# Patient Record
Sex: MALE | Race: WHITE | NOT HISPANIC OR LATINO | URBAN - METROPOLITAN AREA
[De-identification: names, ages, dates, MRNs, and addresses within clinical notes are randomized per-mention and may not be internally consistent; named-entity substitution may affect disease eponyms.]

---

## 2023-10-12 ENCOUNTER — APPOINTMENT (OUTPATIENT)
Dept: UROLOGY | Facility: CLINIC | Age: 63
End: 2023-10-12
Payer: MEDICAID

## 2023-10-12 ASSESSMENT — ENCOUNTER SYMPTOMS
EYES NEGATIVE: 1
ENDOCRINE NEGATIVE: 1
COUGH: 0
PSYCHIATRIC NEGATIVE: 1
NAUSEA: 0
CHILLS: 0
DIFFICULTY URINATING: 0
SHORTNESS OF BREATH: 0
FEVER: 0
ALLERGIC/IMMUNOLOGIC NEGATIVE: 1

## 2023-10-12 NOTE — PROGRESS NOTES
Subjective   Patient ID: Nazario Alejandra is a 63 y.o. male.    HPI  Patient is here for kidney stone. Recent CT on 10/4/23 showed 3mm left nonobstructive renal stone. No hx of stones.  Chronic BPH sx are mild and stable. Denies urgency and frequency. Denies dysuria. Denies hematuria. Nocturia x1. He is taking Flomax and finds this helpful. Hx of retention. Does know [facility] but has not had to do this. Recent CR was 0.84 on 7/22. Most recent PSA was 0.87 on 7/22. Prior PSA was 0.67 on 2/2019. ED is chronic. No medication for this.      Review of Systems   Constitutional:  Negative for chills and fever.   HENT: Negative.     Eyes: Negative.    Respiratory:  Negative for cough and shortness of breath.    Cardiovascular:  Negative for chest pain and leg swelling.   Gastrointestinal:  Negative for nausea.   Endocrine: Negative.    Genitourinary:  Negative for difficulty urinating.        Negative except for documented in HPI   Allergic/Immunologic: Negative.    Neurological:         Alert & oriented X 3   Hematological:         Denies blood thinners   Psychiatric/Behavioral: Negative.         Objective   Physical Exam    Assessment/Plan   There are no diagnoses linked to this encounter.

## 2024-03-05 PROBLEM — N13.8 BENIGN PROSTATIC HYPERPLASIA WITH URINARY OBSTRUCTION: Status: ACTIVE | Noted: 2022-11-03

## 2024-03-05 PROBLEM — N52.9 ERECTILE DYSFUNCTION: Status: ACTIVE | Noted: 2022-11-03

## 2024-03-05 PROBLEM — R35.1 NOCTURIA: Status: ACTIVE | Noted: 2024-03-05

## 2024-03-05 PROBLEM — N40.1 BENIGN PROSTATIC HYPERPLASIA WITH URINARY OBSTRUCTION: Status: ACTIVE | Noted: 2022-11-03

## 2024-03-05 ASSESSMENT — ENCOUNTER SYMPTOMS
CHILLS: 0
NAUSEA: 0
EYES NEGATIVE: 1
ENDOCRINE NEGATIVE: 1
PSYCHIATRIC NEGATIVE: 1
FEVER: 0
SHORTNESS OF BREATH: 0
DIFFICULTY URINATING: 0
COUGH: 0
ALLERGIC/IMMUNOLOGIC NEGATIVE: 1

## 2024-03-05 NOTE — PROGRESS NOTES
Subjective   Patient ID: Nazario Alejandra is a 63 y.o. male.    HPI  Patient is here for prostate issues. He states he is most bothered at night by hesitancy and small volumes. Nocturia is 3x.  He is taking Flomax and does not find this helpful any more. He does know [facility] but has not had to do this. CT done on 10/23 showed left sided 3mm stone.       Review of Systems   Constitutional:  Negative for chills and fever.   HENT: Negative.     Eyes: Negative.    Respiratory:  Negative for cough and shortness of breath.    Cardiovascular:  Negative for chest pain and leg swelling.   Gastrointestinal:  Negative for nausea.   Endocrine: Negative.    Genitourinary:  Negative for difficulty urinating.        Negative except for documented in HPI   Allergic/Immunologic: Negative.    Neurological:         Alert & oriented X 3   Hematological:         Denies blood thinners   Psychiatric/Behavioral: Negative.         Objective   Physical Exam  Vitals and nursing note reviewed.   Constitutional:       General: He is not in acute distress.     Appearance: Normal appearance.   Pulmonary:      Effort: Pulmonary effort is normal.   Abdominal:      Tenderness: There is no abdominal tenderness.   Genitourinary:     Comments: Kidneys non palpable bilaterally  Bladder non palpable or tender  Scrotum no mass, No hydrocele  Epididymis- No spermatocele. Non Tender.  Testicles: No mass. WNL  Urethra: No discharge  Penis within normal limits... No lesions. CIrcumcised  Prostate - symmetric, no nodules. Benign  Seminal Vesicals: No mass.  Sphincter tone: normal  Neurological:      Mental Status: He is alert.         Assessment/Plan   Diagnoses and all orders for this visit:  Benign prostatic hyperplasia with urinary obstruction  Erectile dysfunction, unspecified erectile dysfunction type  Nocturia    All available PSA values reviewed, Options discussed. Questions answered.  PSA ordered   Diet changes for prostate health discussed and educational  information given. Pros/Cons of prostate health supplements discussed.   Treatment options for LUTS reviewed  Increase Flomax to 0.8mg po at bedtime for BPH/Nocturia  Discussed DDAVP if Sx persist  Discussed timed voiding. Discussed fluid and caffeine intake  Treatment options for ED reviewed.  Lifestyle change to help prevent UTIs discussed. Encouraged fluid intake.  Past UA reviewed-Chronic microhematuria-Discussed Cysto  CT reeviewed from 10/23    F/U

## 2024-03-07 ENCOUNTER — OFFICE VISIT (OUTPATIENT)
Dept: UROLOGY | Facility: CLINIC | Age: 64
End: 2024-03-07
Payer: COMMERCIAL

## 2024-03-07 ENCOUNTER — LAB (OUTPATIENT)
Dept: LAB | Facility: LAB | Age: 64
End: 2024-03-07
Payer: MEDICARE

## 2024-03-07 VITALS
WEIGHT: 225 LBS | BODY MASS INDEX: 29.82 KG/M2 | DIASTOLIC BLOOD PRESSURE: 73 MMHG | HEART RATE: 77 BPM | SYSTOLIC BLOOD PRESSURE: 176 MMHG | HEIGHT: 73 IN

## 2024-03-07 DIAGNOSIS — N13.8 BENIGN PROSTATIC HYPERPLASIA WITH URINARY OBSTRUCTION: ICD-10-CM

## 2024-03-07 DIAGNOSIS — N40.1 BENIGN PROSTATIC HYPERPLASIA WITH URINARY OBSTRUCTION: ICD-10-CM

## 2024-03-07 DIAGNOSIS — N52.9 ERECTILE DYSFUNCTION, UNSPECIFIED ERECTILE DYSFUNCTION TYPE: ICD-10-CM

## 2024-03-07 DIAGNOSIS — R35.1 NOCTURIA: ICD-10-CM

## 2024-03-07 LAB — PSA SERPL-MCNC: 0.58 NG/ML

## 2024-03-07 PROCEDURE — 36415 COLL VENOUS BLD VENIPUNCTURE: CPT

## 2024-03-07 PROCEDURE — 84153 ASSAY OF PSA TOTAL: CPT

## 2024-03-07 PROCEDURE — 1159F MED LIST DOCD IN RCRD: CPT | Performed by: UROLOGY

## 2024-03-07 PROCEDURE — 99214 OFFICE O/P EST MOD 30 MIN: CPT | Performed by: UROLOGY

## 2024-03-07 RX ORDER — TAMSULOSIN HYDROCHLORIDE 0.4 MG/1
0.4 CAPSULE ORAL
COMMUNITY
Start: 2022-10-09 | End: 2024-03-11 | Stop reason: SDUPTHER

## 2024-03-07 RX ORDER — IPRATROPIUM BROMIDE AND ALBUTEROL SULFATE 2.5; .5 MG/3ML; MG/3ML
3 SOLUTION RESPIRATORY (INHALATION) AS NEEDED
COMMUNITY
Start: 2023-01-18

## 2024-03-07 RX ORDER — EXENATIDE 2 MG/.85ML
INJECTION, SUSPENSION, EXTENDED RELEASE SUBCUTANEOUS
COMMUNITY

## 2024-03-07 RX ORDER — INSULIN LISPRO 100 [IU]/ML
12 INJECTION, SOLUTION INTRAVENOUS; SUBCUTANEOUS 3 TIMES DAILY
COMMUNITY
Start: 2022-03-11

## 2024-03-07 RX ORDER — ASPIRIN 81 MG/1
81 TABLET ORAL
COMMUNITY

## 2024-03-07 RX ORDER — SYRING-NEEDL,DISP,INSUL,0.3 ML 30 GX5/16"
SYRINGE, EMPTY DISPOSABLE MISCELLANEOUS
COMMUNITY
Start: 2023-07-06

## 2024-03-07 RX ORDER — INSULIN GLARGINE 100 [IU]/ML
52 INJECTION, SOLUTION SUBCUTANEOUS
COMMUNITY
Start: 2022-03-11

## 2024-03-07 RX ORDER — LANCETS 28 GAUGE
EACH MISCELLANEOUS
COMMUNITY
Start: 2024-02-09

## 2024-03-07 RX ORDER — DAPAGLIFLOZIN 10 MG/1
10 TABLET, FILM COATED ORAL DAILY
COMMUNITY

## 2024-03-07 RX ORDER — GABAPENTIN 600 MG/1
600 TABLET ORAL 3 TIMES DAILY
COMMUNITY

## 2024-03-07 RX ORDER — ATORVASTATIN CALCIUM 40 MG/1
40 TABLET, FILM COATED ORAL
COMMUNITY
Start: 2017-03-28

## 2024-03-07 RX ORDER — FAMOTIDINE 40 MG/1
TABLET, FILM COATED ORAL
COMMUNITY

## 2024-03-07 RX ORDER — GLIPIZIDE 10 MG/1
10 TABLET ORAL
COMMUNITY

## 2024-03-07 RX ORDER — VERAPAMIL HYDROCHLORIDE 180 MG/1
180 TABLET, FILM COATED, EXTENDED RELEASE ORAL
COMMUNITY
Start: 2022-07-08

## 2024-03-07 RX ORDER — ALBUTEROL SULFATE 90 UG/1
2 AEROSOL, METERED RESPIRATORY (INHALATION) EVERY 4 HOURS PRN
COMMUNITY
Start: 2022-01-24

## 2024-03-07 RX ORDER — LISINOPRIL 10 MG/1
10 TABLET ORAL
COMMUNITY
Start: 2024-01-19 | End: 2024-10-29

## 2024-03-07 RX ORDER — OXYCODONE AND ACETAMINOPHEN 5; 325 MG/1; MG/1
1 TABLET ORAL 4 TIMES DAILY
COMMUNITY

## 2024-03-07 RX ORDER — ESCITALOPRAM OXALATE 20 MG/1
1 TABLET ORAL DAILY
COMMUNITY
Start: 2020-10-05

## 2024-03-07 RX ORDER — CLORAZEPATE DIPOTASSIUM 7.5 MG/1
7.5 TABLET ORAL 3 TIMES DAILY
COMMUNITY

## 2024-03-07 RX ORDER — DESVENLAFAXINE 50 MG/1
50 TABLET, EXTENDED RELEASE ORAL EVERY 24 HOURS
COMMUNITY

## 2024-03-07 RX ORDER — DESVENLAFAXINE SUCCINATE 50 MG/1
100 TABLET, EXTENDED RELEASE ORAL
COMMUNITY
Start: 2024-01-19

## 2024-03-07 RX ORDER — GLIMEPIRIDE 4 MG/1
4 TABLET ORAL
COMMUNITY

## 2024-03-07 RX ORDER — HYDROXYZINE HYDROCHLORIDE 25 MG/1
10 TABLET, FILM COATED ORAL NIGHTLY
COMMUNITY

## 2024-03-07 ASSESSMENT — PATIENT HEALTH QUESTIONNAIRE - PHQ9
SUM OF ALL RESPONSES TO PHQ9 QUESTIONS 1 AND 2: 0
1. LITTLE INTEREST OR PLEASURE IN DOING THINGS: NOT AT ALL
2. FEELING DOWN, DEPRESSED OR HOPELESS: NOT AT ALL

## 2024-03-08 ASSESSMENT — ENCOUNTER SYMPTOMS
DIFFICULTY URINATING: 0
NAUSEA: 0
CHILLS: 0
FEVER: 0
PSYCHIATRIC NEGATIVE: 1
EYES NEGATIVE: 1
COUGH: 0
ALLERGIC/IMMUNOLOGIC NEGATIVE: 1
SHORTNESS OF BREATH: 0
ENDOCRINE NEGATIVE: 1

## 2024-03-08 NOTE — PROGRESS NOTES
Virtual or Telephone Consent    A telephone visit (audio only) between the patient (at the originating site) and the provider (at the distant site) was utilized to provide this telehealth service.   Verbal consent was requested and obtained from Nazario Alejandra on this date, 03/11/24 for a telehealth visit. Subjective   Patient ID: Nazario Alejandra is a 63 y.o. male.    HPI  Patient is here for 1 week follow up with PSA results. Most recent PSA was 0.58 on 3/24. Prior PSA was 0.87 on 3/23. He was also increased to Flomax BID and states it has helped his sx. He does know [facility] but has not had to do this. CT on 10/23 showed left sided 3mm stone.       Review of Systems   Constitutional:  Negative for chills and fever.   HENT: Negative.     Eyes: Negative.    Respiratory:  Negative for cough and shortness of breath.    Cardiovascular:  Negative for chest pain and leg swelling.   Gastrointestinal:  Negative for nausea.   Endocrine: Negative.    Genitourinary:  Negative for difficulty urinating.        Negative except for documented in HPI   Allergic/Immunologic: Negative.    Neurological:         Alert & oriented X 3   Hematological:         Denies blood thinners   Psychiatric/Behavioral: Negative.         Objective   Physical Exam  No PE done given the virtual nature of visit.   Assessment/Plan   Diagnoses and all orders for this visit:  Benign prostatic hyperplasia with urinary obstruction  Erectile dysfunction, unspecified erectile dysfunction type  Nocturia      All available PSA values reviewed, Options discussed. Questions answered.   Diet changes for prostate health discussed and educational information given. Pros/Cons of prostate health supplements discussed.   Treatment options for LUTS reviewed  Flomax Rx refilled. BID definitely helpful  Discussed timed voiding. Discussed fluid and caffeine intake  Treatment options for ED reviewed.  Lifestyle change to help prevent UTIs discussed. Encouraged fluid intake.    F/U   1 year with PSA

## 2024-03-11 ENCOUNTER — TELEMEDICINE (OUTPATIENT)
Dept: UROLOGY | Facility: CLINIC | Age: 64
End: 2024-03-11
Payer: COMMERCIAL

## 2024-03-11 DIAGNOSIS — R35.1 NOCTURIA: ICD-10-CM

## 2024-03-11 DIAGNOSIS — N52.9 ERECTILE DYSFUNCTION, UNSPECIFIED ERECTILE DYSFUNCTION TYPE: ICD-10-CM

## 2024-03-11 DIAGNOSIS — N13.8 BENIGN PROSTATIC HYPERPLASIA WITH URINARY OBSTRUCTION: ICD-10-CM

## 2024-03-11 DIAGNOSIS — N40.1 BENIGN PROSTATIC HYPERPLASIA WITH URINARY OBSTRUCTION: ICD-10-CM

## 2024-03-11 PROCEDURE — 1159F MED LIST DOCD IN RCRD: CPT | Performed by: UROLOGY

## 2024-03-11 PROCEDURE — 99442 PR PHYS/QHP TELEPHONE EVALUATION 11-20 MIN: CPT | Performed by: UROLOGY

## 2024-03-11 RX ORDER — TAMSULOSIN HYDROCHLORIDE 0.4 MG/1
0.4 CAPSULE ORAL 2 TIMES DAILY
Qty: 60 CAPSULE | Refills: 11 | Status: SHIPPED | OUTPATIENT
Start: 2024-03-11 | End: 2025-03-11